# Patient Record
Sex: FEMALE | Race: WHITE | Employment: FULL TIME | URBAN - METROPOLITAN AREA
[De-identification: names, ages, dates, MRNs, and addresses within clinical notes are randomized per-mention and may not be internally consistent; named-entity substitution may affect disease eponyms.]

---

## 2017-11-30 ENCOUNTER — HOSPITAL ENCOUNTER (OUTPATIENT)
Dept: PREADMISSION TESTING | Age: 62
Discharge: HOME | End: 2017-11-30
Payer: COMMERCIAL

## 2017-11-30 VITALS — HEIGHT: 66 IN | WEIGHT: 157 LBS | BODY MASS INDEX: 25.23 KG/M2

## 2017-11-30 LAB
ANION GAP SERPL CALC-SCNC: 11 MMOL/L (ref 3–18)
APPEARANCE UR: CLEAR
ATRIAL RATE: 73 BPM
BACTERIA URNS QL MICRO: ABNORMAL /HPF
BILIRUB UR QL: NEGATIVE
BUN SERPL-MCNC: 20 MG/DL (ref 7–18)
BUN/CREAT SERPL: 24 (ref 12–20)
CALCIUM SERPL-MCNC: 9 MG/DL (ref 8.5–10.1)
CALCULATED P AXIS, ECG09: 58 DEGREES
CALCULATED R AXIS, ECG10: 27 DEGREES
CALCULATED T AXIS, ECG11: 64 DEGREES
CHLORIDE SERPL-SCNC: 106 MMOL/L (ref 100–108)
CO2 SERPL-SCNC: 28 MMOL/L (ref 21–32)
COLOR UR: YELLOW
CREAT SERPL-MCNC: 0.83 MG/DL (ref 0.6–1.3)
DIAGNOSIS, 93000: NORMAL
EPITH CASTS URNS QL MICRO: NEGATIVE /LPF (ref 0–5)
ERYTHROCYTE [DISTWIDTH] IN BLOOD BY AUTOMATED COUNT: 13.6 % (ref 11.6–14.5)
GLUCOSE SERPL-MCNC: 146 MG/DL (ref 74–99)
GLUCOSE UR STRIP.AUTO-MCNC: NEGATIVE MG/DL
HCT VFR BLD AUTO: 37.9 % (ref 35–45)
HGB BLD-MCNC: 12.7 G/DL (ref 12–16)
HGB UR QL STRIP: NEGATIVE
KETONES UR QL STRIP.AUTO: NEGATIVE MG/DL
LEUKOCYTE ESTERASE UR QL STRIP.AUTO: ABNORMAL
MCH RBC QN AUTO: 30.8 PG (ref 24–34)
MCHC RBC AUTO-ENTMCNC: 33.5 G/DL (ref 31–37)
MCV RBC AUTO: 92 FL (ref 74–97)
NITRITE UR QL STRIP.AUTO: NEGATIVE
P-R INTERVAL, ECG05: 154 MS
PH UR STRIP: 5 [PH] (ref 5–8)
PLATELET # BLD AUTO: 312 K/UL (ref 135–420)
PMV BLD AUTO: 11.1 FL (ref 9.2–11.8)
POTASSIUM SERPL-SCNC: 3.9 MMOL/L (ref 3.5–5.5)
PROT UR STRIP-MCNC: NEGATIVE MG/DL
Q-T INTERVAL, ECG07: 430 MS
QRS DURATION, ECG06: 92 MS
QTC CALCULATION (BEZET), ECG08: 473 MS
RBC # BLD AUTO: 4.12 M/UL (ref 4.2–5.3)
RBC #/AREA URNS HPF: NEGATIVE /HPF (ref 0–5)
SODIUM SERPL-SCNC: 145 MMOL/L (ref 136–145)
SP GR UR REFRACTOMETRY: 1.01 (ref 1–1.03)
UROBILINOGEN UR QL STRIP.AUTO: 0.2 EU/DL (ref 0.2–1)
VENTRICULAR RATE, ECG03: 73 BPM
WBC # BLD AUTO: 8.4 K/UL (ref 4.6–13.2)
WBC URNS QL MICRO: ABNORMAL /HPF (ref 0–5)

## 2017-11-30 PROCEDURE — 80048 BASIC METABOLIC PNL TOTAL CA: CPT | Performed by: ORTHOPAEDIC SURGERY

## 2017-11-30 PROCEDURE — 87641 MR-STAPH DNA AMP PROBE: CPT | Performed by: ORTHOPAEDIC SURGERY

## 2017-11-30 PROCEDURE — 87077 CULTURE AEROBIC IDENTIFY: CPT | Performed by: ORTHOPAEDIC SURGERY

## 2017-11-30 PROCEDURE — 93005 ELECTROCARDIOGRAM TRACING: CPT

## 2017-11-30 PROCEDURE — 81001 URINALYSIS AUTO W/SCOPE: CPT | Performed by: ORTHOPAEDIC SURGERY

## 2017-11-30 PROCEDURE — 85027 COMPLETE CBC AUTOMATED: CPT | Performed by: ORTHOPAEDIC SURGERY

## 2017-11-30 PROCEDURE — 87086 URINE CULTURE/COLONY COUNT: CPT | Performed by: ORTHOPAEDIC SURGERY

## 2017-11-30 PROCEDURE — 87186 SC STD MICRODIL/AGAR DIL: CPT | Performed by: ORTHOPAEDIC SURGERY

## 2017-11-30 RX ORDER — LANOLIN ALCOHOL/MO/W.PET/CERES
CREAM (GRAM) TOPICAL
COMMUNITY

## 2017-11-30 RX ORDER — MULTIVIT WITH MINERALS/HERBS
1 TABLET ORAL DAILY
COMMUNITY
End: 2017-12-20

## 2017-11-30 RX ORDER — CEFAZOLIN SODIUM 2 G/50ML
2 SOLUTION INTRAVENOUS ONCE
Status: CANCELLED | OUTPATIENT
Start: 2017-11-30 | End: 2017-11-30

## 2017-11-30 RX ORDER — LANOLIN ALCOHOL/MO/W.PET/CERES
1 CREAM (GRAM) TOPICAL DAILY
COMMUNITY
End: 2017-12-20

## 2017-11-30 RX ORDER — CITALOPRAM 10 MG/1
10 TABLET ORAL DAILY
COMMUNITY

## 2017-12-01 LAB
BACTERIA SPEC CULT: NORMAL
SERVICE CMNT-IMP: NORMAL

## 2017-12-02 LAB
BACTERIA SPEC CULT: ABNORMAL
SERVICE CMNT-IMP: ABNORMAL

## 2017-12-08 PROBLEM — N39.0 UTI (URINARY TRACT INFECTION): Chronic | Status: ACTIVE | Noted: 2017-12-08

## 2017-12-13 PROBLEM — M16.11 OSTEOARTHRITIS OF RIGHT HIP: Chronic | Status: ACTIVE | Noted: 2017-12-13

## 2017-12-13 PROBLEM — Z86.79 HISTORY OF RUPTURED ARTERIAL ANEURYSM: Chronic | Status: ACTIVE | Noted: 2017-12-13

## 2017-12-13 NOTE — H&P
History and Physical        Patient: Marleni Wolf               Sex: female          DOA: (Not on file)         YOB: 1955      Age:  57 y.o.        LOS:  LOS: 0 days        HPI:     Asha Bertrand is a 57year old right handed white female referred here for evaluation and treatment of right severe coxarthrosis. She is here today and has been known to have arthritis of the hip for the last year and this has just gotten steadily worse with time. Her pain is all in the right groin. She has not noted a leg length discrepancy. She had a recent brain aneurysm treated with a craniotomy and a secondary femoral artery procedure and is now here for consideration of trying to get her right hip done before the end of the year because of increasing pain in the hip and loss of stability. An AP view of the pelvis was obtained and interpreted in the office and show severe coxarthrosis with relatively equal leg lengths and large spur formation laterally. Otherwise, x-rays reveal no periosteal reaction, no medullary lesions, no osteopenia, well-aligned joint spaces, no chondrolysis, and no fractures. Past Medical History:   Diagnosis Date    OA (osteoarthritis) of hip 2017    right    Psychiatric disorder     depression       Past Surgical History:   Procedure Laterality Date    HX  SECTION  26    HX OTHER SURGICAL  2017    ruptured brain aneurysm repair       No family history on file.     Social History     Social History    Marital status:      Spouse name: N/A    Number of children: N/A    Years of education: N/A     Social History Main Topics    Smoking status: Never Smoker    Smokeless tobacco: Never Used    Alcohol use 2.4 oz/week     4 Glasses of wine per week    Drug use: No    Sexual activity: Not on file     Other Topics Concern    Not on file     Social History Narrative    No narrative on file       Prior to Admission medications    Medication Sig Start Date End Date Taking? Authorizing Provider   citalopram (CELEXA) 10 mg tablet Take 10 mg by mouth daily. Indications: VASOMOTOR SYMPTOMS ASSOCIATED WITH MENOPAUSE, depression    Historical Provider   ferrous sulfate (IRON) 325 mg (65 mg iron) tablet Take  by mouth Daily (before breakfast). Historical Provider   b complex vitamins (B COMPLEX 1) tablet Take 1 Tab by mouth daily. Historical Provider   glucosamine-chondroitin (ARTHX) 500-400 mg cap Take 1 Cap by mouth daily. Historical Provider       No Known Allergies    Review of Systems    Pertinent positives include joint pain and joint stiffness. Pertinent negatives include blurred vision, chest pain, chills, cold, discharge of the eyes, dizziness, double vision, fever, headache, hearing loss, heart murmur, itching of the eyes, palpitations, redness of the eyes, rheumatic fever, ringing in ears, sore throat/hoarseness, weight change, abdominal pain, anxiety, bipolar disorder, bladder/kidney infection, bloody stool, blood in urine, burning sensation, changes in mood, chronic cough, depression, diarrhea, difficulty breathing, difficulty swallowing, fainting, frequent urinating, fracture/dislocation, gas/bloating, gout, hemorrhoids, incontinence, loss of balance, memory loss, muscle weakness, nausea/vomiting, numbness/tingling, pain on breathing, painful urination, psoriasis, rash/itching, Raynaud's phenomenon, rheumatoid disease, seizure disorder, shortness of breath, sprain/strain, swelling of feet, tendonitis, varicose veins and wheezing. Physical Exam:      There were no vitals taken for this visit. Physical Exam:  Physical exam shows a healthy appearing 57year old white female. The right hip has only about a 25 degree arc of motion with pain referred back to the right groin. Her leg lengths appear equal today. Examination of the  hip shows the patient is nontender to palpation. The skin is normal with no contusions or wounds.   There is no pain at either hip or the greater trochanters. As the iliotibial band is stretched, there is no pain. The patient has normal light touch sensation in all dermatomal patterns. There is normal motor strength to heel and toe walking. There is negative straight leg raising bilaterally to 90 degrees in the seated position. The patient has good capillary refill. Assessment/Plan     Principal Problem:    Osteoarthritis of right hip (12/13/2017)    Active Problems:    UTI (urinary tract infection) (12/8/2017)      History of ruptured arterial aneurysm (12/13/2017)      Dr. Dinorah Bowman discussed a right direct anterior ROD with her. He talked to the patient about the risks, the alternatives, and the benefits including infection, pain, bleeding, and DVT. She is going to check with her neurosurgeon to make sure aspirin postoperatively for three weeks at 81mg twice a day is acceptable. We will use perioperative antibiotics because she is going to be an outpatient total hip and we will arrange my outpatient protocol. We will keep her leg length equal or lengthen it a millimeter or two and Dr. Dinorah Bowman issued Roxicodone and Keflex and sent this to her pharmacy today. She will pick it up ahead of time with the aspirin. Dr. Dinorah Bowman will plan to see her two weeks postoperatively.

## 2017-12-14 NOTE — PERIOP NOTES
Faxed and left v/m on 12/7 regarding anesthesia request for clearance due to ekg results, ekg faxed as well. Left v/m on 12/14 for update to the above.

## 2017-12-19 RX ORDER — SODIUM CHLORIDE 0.9 % (FLUSH) 0.9 %
5-10 SYRINGE (ML) INJECTION AS NEEDED
Status: CANCELLED | OUTPATIENT
Start: 2017-12-19

## 2017-12-19 RX ORDER — SODIUM CHLORIDE 0.9 % (FLUSH) 0.9 %
5-10 SYRINGE (ML) INJECTION EVERY 8 HOURS
Status: CANCELLED | OUTPATIENT
Start: 2017-12-19

## 2017-12-19 RX ORDER — DIPHENHYDRAMINE HCL 25 MG
25 CAPSULE ORAL
Status: CANCELLED | OUTPATIENT
Start: 2017-12-19

## 2017-12-20 ENCOUNTER — ANESTHESIA EVENT (OUTPATIENT)
Dept: SURGERY | Age: 62
End: 2017-12-20
Payer: COMMERCIAL

## 2017-12-20 ENCOUNTER — APPOINTMENT (OUTPATIENT)
Dept: GENERAL RADIOLOGY | Age: 62
End: 2017-12-20
Attending: PHYSICIAN ASSISTANT
Payer: COMMERCIAL

## 2017-12-20 ENCOUNTER — HOSPITAL ENCOUNTER (OUTPATIENT)
Age: 62
Setting detail: OUTPATIENT SURGERY
Discharge: HOME HEALTH | End: 2017-12-20
Attending: ORTHOPAEDIC SURGERY | Admitting: ORTHOPAEDIC SURGERY
Payer: COMMERCIAL

## 2017-12-20 ENCOUNTER — ANESTHESIA (OUTPATIENT)
Dept: SURGERY | Age: 62
End: 2017-12-20
Payer: COMMERCIAL

## 2017-12-20 VITALS
DIASTOLIC BLOOD PRESSURE: 87 MMHG | HEIGHT: 66 IN | WEIGHT: 153.31 LBS | OXYGEN SATURATION: 99 % | TEMPERATURE: 97.4 F | BODY MASS INDEX: 24.64 KG/M2 | SYSTOLIC BLOOD PRESSURE: 132 MMHG | RESPIRATION RATE: 16 BRPM | HEART RATE: 88 BPM

## 2017-12-20 LAB
ABO + RH BLD: NORMAL
BLOOD GROUP ANTIBODIES SERPL: NORMAL
SPECIMEN EXP DATE BLD: NORMAL

## 2017-12-20 PROCEDURE — 77030018836 HC SOL IRR NACL ICUM -A: Performed by: ORTHOPAEDIC SURGERY

## 2017-12-20 PROCEDURE — 77030034479 HC ADH SKN CLSR PRINEO J&J -B: Performed by: ORTHOPAEDIC SURGERY

## 2017-12-20 PROCEDURE — 97161 PT EVAL LOW COMPLEX 20 MIN: CPT

## 2017-12-20 PROCEDURE — 36415 COLL VENOUS BLD VENIPUNCTURE: CPT | Performed by: ORTHOPAEDIC SURGERY

## 2017-12-20 PROCEDURE — 74011000258 HC RX REV CODE- 258: Performed by: PHYSICIAN ASSISTANT

## 2017-12-20 PROCEDURE — 74011250636 HC RX REV CODE- 250/636: Performed by: PHYSICIAN ASSISTANT

## 2017-12-20 PROCEDURE — 77030018846 HC SOL IRR STRL H20 ICUM -A: Performed by: ORTHOPAEDIC SURGERY

## 2017-12-20 PROCEDURE — 77030031139 HC SUT VCRL2 J&J -A: Performed by: ORTHOPAEDIC SURGERY

## 2017-12-20 PROCEDURE — 74011000258 HC RX REV CODE- 258: Performed by: ORTHOPAEDIC SURGERY

## 2017-12-20 PROCEDURE — 86900 BLOOD TYPING SEROLOGIC ABO: CPT | Performed by: ORTHOPAEDIC SURGERY

## 2017-12-20 PROCEDURE — 74011000250 HC RX REV CODE- 250

## 2017-12-20 PROCEDURE — 74011250636 HC RX REV CODE- 250/636

## 2017-12-20 PROCEDURE — 74011250636 HC RX REV CODE- 250/636: Performed by: ANESTHESIOLOGY

## 2017-12-20 PROCEDURE — 76210000017 HC OR PH I REC 1.5 TO 2 HR: Performed by: ORTHOPAEDIC SURGERY

## 2017-12-20 PROCEDURE — 76210000023 HC REC RM PH II 2 TO 2.5 HR: Performed by: ORTHOPAEDIC SURGERY

## 2017-12-20 PROCEDURE — 74011250637 HC RX REV CODE- 250/637: Performed by: ANESTHESIOLOGY

## 2017-12-20 PROCEDURE — 97116 GAIT TRAINING THERAPY: CPT

## 2017-12-20 PROCEDURE — 77030011640 HC PAD GRND REM COVD -A: Performed by: ORTHOPAEDIC SURGERY

## 2017-12-20 PROCEDURE — 77030038010: Performed by: ORTHOPAEDIC SURGERY

## 2017-12-20 PROCEDURE — 77030037875 HC DRSG MEPILEX <16IN BORD MOLN -A: Performed by: ORTHOPAEDIC SURGERY

## 2017-12-20 PROCEDURE — 77030020782 HC GWN BAIR PAWS FLX 3M -B: Performed by: ORTHOPAEDIC SURGERY

## 2017-12-20 PROCEDURE — 77030018673: Performed by: ORTHOPAEDIC SURGERY

## 2017-12-20 PROCEDURE — 73502 X-RAY EXAM HIP UNI 2-3 VIEWS: CPT

## 2017-12-20 PROCEDURE — 77030012508 HC MSK AIRWY LMA AMBU -A: Performed by: ANESTHESIOLOGY

## 2017-12-20 PROCEDURE — 76060000033 HC ANESTHESIA 1 TO 1.5 HR: Performed by: ORTHOPAEDIC SURGERY

## 2017-12-20 PROCEDURE — 77030013708 HC HNDPC SUC IRR PULS STRY –B: Performed by: ORTHOPAEDIC SURGERY

## 2017-12-20 PROCEDURE — 74011000250 HC RX REV CODE- 250: Performed by: PHYSICIAN ASSISTANT

## 2017-12-20 PROCEDURE — 74011000250 HC RX REV CODE- 250: Performed by: ORTHOPAEDIC SURGERY

## 2017-12-20 PROCEDURE — 77030010507 HC ADH SKN DERMBND J&J -B: Performed by: ORTHOPAEDIC SURGERY

## 2017-12-20 PROCEDURE — 76010000149 HC OR TIME 1 TO 1.5 HR: Performed by: ORTHOPAEDIC SURGERY

## 2017-12-20 PROCEDURE — C1776 JOINT DEVICE (IMPLANTABLE): HCPCS | Performed by: ORTHOPAEDIC SURGERY

## 2017-12-20 PROCEDURE — 77030032490 HC SLV COMPR SCD KNE COVD -B: Performed by: ORTHOPAEDIC SURGERY

## 2017-12-20 PROCEDURE — 77030026044 HC TIP IRR PULS STRY -A: Performed by: ORTHOPAEDIC SURGERY

## 2017-12-20 PROCEDURE — 74011250636 HC RX REV CODE- 250/636: Performed by: ORTHOPAEDIC SURGERY

## 2017-12-20 DEVICE — CUP ACET DIA52MM HIP GRIPTION PRI CEMENTLESS FIX SECT SER: Type: IMPLANTABLE DEVICE | Site: HIP | Status: FUNCTIONAL

## 2017-12-20 DEVICE — ELIMINATOR H APEX FOR 48-60MM PINN HIP SHELL: Type: IMPLANTABLE DEVICE | Site: HIP | Status: FUNCTIONAL

## 2017-12-20 DEVICE — STEM FEM SZ 4 L103MM 130DEG CALCAR HIP GRIPTION 12/14 TAPR: Type: IMPLANTABLE DEVICE | Site: HIP | Status: FUNCTIONAL

## 2017-12-20 DEVICE — LINER ACET OD52MM ID36MM HIP ALTRX PINN: Type: IMPLANTABLE DEVICE | Site: HIP | Status: FUNCTIONAL

## 2017-12-20 DEVICE — HEAD FEM DIA36MM +1.5MM OFFSET 12/14 TAPR HIP CERAMIC: Type: IMPLANTABLE DEVICE | Site: HIP | Status: FUNCTIONAL

## 2017-12-20 DEVICE — COMPONENT TOT HIP PRIMARY CERM ALTRX: Type: IMPLANTABLE DEVICE | Site: HIP | Status: FUNCTIONAL

## 2017-12-20 RX ORDER — SODIUM CHLORIDE, SODIUM LACTATE, POTASSIUM CHLORIDE, CALCIUM CHLORIDE 600; 310; 30; 20 MG/100ML; MG/100ML; MG/100ML; MG/100ML
50 INJECTION, SOLUTION INTRAVENOUS CONTINUOUS
Status: DISCONTINUED | OUTPATIENT
Start: 2017-12-20 | End: 2017-12-20 | Stop reason: HOSPADM

## 2017-12-20 RX ORDER — MAGNESIUM SULFATE 100 %
4 CRYSTALS MISCELLANEOUS AS NEEDED
Status: DISCONTINUED | OUTPATIENT
Start: 2017-12-20 | End: 2017-12-20 | Stop reason: HOSPADM

## 2017-12-20 RX ORDER — DEXAMETHASONE SODIUM PHOSPHATE 4 MG/ML
INJECTION, SOLUTION INTRA-ARTICULAR; INTRALESIONAL; INTRAMUSCULAR; INTRAVENOUS; SOFT TISSUE AS NEEDED
Status: DISCONTINUED | OUTPATIENT
Start: 2017-12-20 | End: 2017-12-20 | Stop reason: HOSPADM

## 2017-12-20 RX ORDER — PROPOFOL 10 MG/ML
INJECTION, EMULSION INTRAVENOUS AS NEEDED
Status: DISCONTINUED | OUTPATIENT
Start: 2017-12-20 | End: 2017-12-20 | Stop reason: HOSPADM

## 2017-12-20 RX ORDER — HYDROMORPHONE HYDROCHLORIDE 2 MG/ML
0.5 INJECTION, SOLUTION INTRAMUSCULAR; INTRAVENOUS; SUBCUTANEOUS
Status: DISCONTINUED | OUTPATIENT
Start: 2017-12-20 | End: 2017-12-20 | Stop reason: HOSPADM

## 2017-12-20 RX ORDER — MAGNESIUM SULFATE 100 %
4 CRYSTALS MISCELLANEOUS AS NEEDED
Status: DISCONTINUED | OUTPATIENT
Start: 2017-12-20 | End: 2017-12-20 | Stop reason: SDUPTHER

## 2017-12-20 RX ORDER — CEFAZOLIN SODIUM 2 G/50ML
2 SOLUTION INTRAVENOUS ONCE
Status: COMPLETED | OUTPATIENT
Start: 2017-12-20 | End: 2017-12-20

## 2017-12-20 RX ORDER — FENTANYL CITRATE 50 UG/ML
25 INJECTION, SOLUTION INTRAMUSCULAR; INTRAVENOUS
Status: ACTIVE | OUTPATIENT
Start: 2017-12-20 | End: 2017-12-20

## 2017-12-20 RX ORDER — LIDOCAINE HYDROCHLORIDE 20 MG/ML
INJECTION, SOLUTION EPIDURAL; INFILTRATION; INTRACAUDAL; PERINEURAL AS NEEDED
Status: DISCONTINUED | OUTPATIENT
Start: 2017-12-20 | End: 2017-12-20 | Stop reason: HOSPADM

## 2017-12-20 RX ORDER — FENTANYL CITRATE 50 UG/ML
INJECTION, SOLUTION INTRAMUSCULAR; INTRAVENOUS AS NEEDED
Status: DISCONTINUED | OUTPATIENT
Start: 2017-12-20 | End: 2017-12-20 | Stop reason: HOSPADM

## 2017-12-20 RX ORDER — DEXTROSE 50 % IN WATER (D50W) INTRAVENOUS SYRINGE
25-50 AS NEEDED
Status: DISCONTINUED | OUTPATIENT
Start: 2017-12-20 | End: 2017-12-20 | Stop reason: SDUPTHER

## 2017-12-20 RX ORDER — CELECOXIB 100 MG/1
200 CAPSULE ORAL
Status: CANCELLED | OUTPATIENT
Start: 2017-12-20 | End: 2017-12-20

## 2017-12-20 RX ORDER — OXYCODONE HYDROCHLORIDE 5 MG/1
10 TABLET ORAL ONCE
Status: COMPLETED | OUTPATIENT
Start: 2017-12-20 | End: 2017-12-20

## 2017-12-20 RX ORDER — INSULIN LISPRO 100 [IU]/ML
INJECTION, SOLUTION INTRAVENOUS; SUBCUTANEOUS ONCE
Status: DISCONTINUED | OUTPATIENT
Start: 2017-12-20 | End: 2017-12-20 | Stop reason: SDUPTHER

## 2017-12-20 RX ORDER — ACETAMINOPHEN 10 MG/ML
1000 INJECTION, SOLUTION INTRAVENOUS ONCE
Status: COMPLETED | OUTPATIENT
Start: 2017-12-20 | End: 2017-12-20

## 2017-12-20 RX ORDER — HYDROMORPHONE HYDROCHLORIDE 1 MG/ML
INJECTION, SOLUTION INTRAMUSCULAR; INTRAVENOUS; SUBCUTANEOUS AS NEEDED
Status: DISCONTINUED | OUTPATIENT
Start: 2017-12-20 | End: 2017-12-20 | Stop reason: HOSPADM

## 2017-12-20 RX ORDER — SODIUM CHLORIDE 0.9 % (FLUSH) 0.9 %
5-10 SYRINGE (ML) INJECTION AS NEEDED
Status: DISCONTINUED | OUTPATIENT
Start: 2017-12-20 | End: 2017-12-20 | Stop reason: HOSPADM

## 2017-12-20 RX ORDER — ONDANSETRON 2 MG/ML
4 INJECTION INTRAMUSCULAR; INTRAVENOUS ONCE
Status: DISCONTINUED | OUTPATIENT
Start: 2017-12-20 | End: 2017-12-20 | Stop reason: HOSPADM

## 2017-12-20 RX ORDER — OXYCODONE HYDROCHLORIDE 5 MG/1
5-10 TABLET ORAL
Qty: 60 TAB | Refills: 0 | Status: SHIPPED | OUTPATIENT
Start: 2017-12-20

## 2017-12-20 RX ORDER — GUAIFENESIN 100 MG/5ML
81 LIQUID (ML) ORAL 2 TIMES DAILY
Qty: 42 TAB | Refills: 0 | Status: SHIPPED | OUTPATIENT
Start: 2017-12-20

## 2017-12-20 RX ORDER — DIPHENHYDRAMINE HYDROCHLORIDE 50 MG/ML
12.5 INJECTION, SOLUTION INTRAMUSCULAR; INTRAVENOUS
Status: DISCONTINUED | OUTPATIENT
Start: 2017-12-20 | End: 2017-12-20 | Stop reason: HOSPADM

## 2017-12-20 RX ORDER — PREGABALIN 100 MG/1
100 CAPSULE ORAL
Status: CANCELLED | OUTPATIENT
Start: 2017-12-20 | End: 2017-12-20

## 2017-12-20 RX ORDER — GLYCOPYRROLATE 0.2 MG/ML
INJECTION INTRAMUSCULAR; INTRAVENOUS AS NEEDED
Status: DISCONTINUED | OUTPATIENT
Start: 2017-12-20 | End: 2017-12-20 | Stop reason: HOSPADM

## 2017-12-20 RX ORDER — CEPHALEXIN 500 MG/1
500 CAPSULE ORAL 4 TIMES DAILY
Qty: 4 CAP | Refills: 0 | Status: SHIPPED | OUTPATIENT
Start: 2017-12-20 | End: 2017-12-21

## 2017-12-20 RX ORDER — ONDANSETRON 2 MG/ML
INJECTION INTRAMUSCULAR; INTRAVENOUS AS NEEDED
Status: DISCONTINUED | OUTPATIENT
Start: 2017-12-20 | End: 2017-12-20 | Stop reason: HOSPADM

## 2017-12-20 RX ORDER — SODIUM CHLORIDE, SODIUM LACTATE, POTASSIUM CHLORIDE, CALCIUM CHLORIDE 600; 310; 30; 20 MG/100ML; MG/100ML; MG/100ML; MG/100ML
125 INJECTION, SOLUTION INTRAVENOUS CONTINUOUS
Status: DISCONTINUED | OUTPATIENT
Start: 2017-12-20 | End: 2017-12-20 | Stop reason: HOSPADM

## 2017-12-20 RX ORDER — ALBUTEROL SULFATE 0.83 MG/ML
2.5 SOLUTION RESPIRATORY (INHALATION) AS NEEDED
Status: DISCONTINUED | OUTPATIENT
Start: 2017-12-20 | End: 2017-12-20 | Stop reason: HOSPADM

## 2017-12-20 RX ORDER — SODIUM CHLORIDE, SODIUM LACTATE, POTASSIUM CHLORIDE, CALCIUM CHLORIDE 600; 310; 30; 20 MG/100ML; MG/100ML; MG/100ML; MG/100ML
150 INJECTION, SOLUTION INTRAVENOUS CONTINUOUS
Status: DISCONTINUED | OUTPATIENT
Start: 2017-12-20 | End: 2017-12-20 | Stop reason: HOSPADM

## 2017-12-20 RX ORDER — MIDAZOLAM HYDROCHLORIDE 1 MG/ML
INJECTION, SOLUTION INTRAMUSCULAR; INTRAVENOUS AS NEEDED
Status: DISCONTINUED | OUTPATIENT
Start: 2017-12-20 | End: 2017-12-20 | Stop reason: HOSPADM

## 2017-12-20 RX ORDER — NALOXONE HYDROCHLORIDE 0.4 MG/ML
0.1 INJECTION, SOLUTION INTRAMUSCULAR; INTRAVENOUS; SUBCUTANEOUS
Status: DISCONTINUED | OUTPATIENT
Start: 2017-12-20 | End: 2017-12-20 | Stop reason: HOSPADM

## 2017-12-20 RX ORDER — DEXTROSE 50 % IN WATER (D50W) INTRAVENOUS SYRINGE
25-50 AS NEEDED
Status: DISCONTINUED | OUTPATIENT
Start: 2017-12-20 | End: 2017-12-20 | Stop reason: HOSPADM

## 2017-12-20 RX ORDER — HYDROCODONE BITARTRATE AND ACETAMINOPHEN 5; 325 MG/1; MG/1
1 TABLET ORAL AS NEEDED
Status: DISCONTINUED | OUTPATIENT
Start: 2017-12-20 | End: 2017-12-20 | Stop reason: HOSPADM

## 2017-12-20 RX ORDER — SODIUM CHLORIDE 0.9 % (FLUSH) 0.9 %
5-10 SYRINGE (ML) INJECTION AS NEEDED
Status: DISCONTINUED | OUTPATIENT
Start: 2017-12-20 | End: 2017-12-20 | Stop reason: SDUPTHER

## 2017-12-20 RX ORDER — OXYCODONE AND ACETAMINOPHEN 5; 325 MG/1; MG/1
1 TABLET ORAL AS NEEDED
Status: DISCONTINUED | OUTPATIENT
Start: 2017-12-20 | End: 2017-12-20 | Stop reason: HOSPADM

## 2017-12-20 RX ORDER — NALOXONE HYDROCHLORIDE 0.4 MG/ML
0.1 INJECTION, SOLUTION INTRAMUSCULAR; INTRAVENOUS; SUBCUTANEOUS AS NEEDED
Status: DISCONTINUED | OUTPATIENT
Start: 2017-12-20 | End: 2017-12-20 | Stop reason: HOSPADM

## 2017-12-20 RX ORDER — INSULIN LISPRO 100 [IU]/ML
INJECTION, SOLUTION INTRAVENOUS; SUBCUTANEOUS ONCE
Status: DISCONTINUED | OUTPATIENT
Start: 2017-12-20 | End: 2017-12-20 | Stop reason: HOSPADM

## 2017-12-20 RX ADMIN — SODIUM CHLORIDE, SODIUM LACTATE, POTASSIUM CHLORIDE, AND CALCIUM CHLORIDE 1000 ML: 600; 310; 30; 20 INJECTION, SOLUTION INTRAVENOUS at 07:37

## 2017-12-20 RX ADMIN — SODIUM CHLORIDE, SODIUM LACTATE, POTASSIUM CHLORIDE, AND CALCIUM CHLORIDE: 600; 310; 30; 20 INJECTION, SOLUTION INTRAVENOUS at 09:30

## 2017-12-20 RX ADMIN — HYDROMORPHONE HYDROCHLORIDE 0.5 MG: 1 INJECTION, SOLUTION INTRAMUSCULAR; INTRAVENOUS; SUBCUTANEOUS at 09:55

## 2017-12-20 RX ADMIN — SODIUM CHLORIDE, SODIUM LACTATE, POTASSIUM CHLORIDE, AND CALCIUM CHLORIDE 125 ML/HR: 600; 310; 30; 20 INJECTION, SOLUTION INTRAVENOUS at 07:16

## 2017-12-20 RX ADMIN — DEXAMETHASONE SODIUM PHOSPHATE 4 MG: 4 INJECTION, SOLUTION INTRA-ARTICULAR; INTRALESIONAL; INTRAMUSCULAR; INTRAVENOUS; SOFT TISSUE at 09:02

## 2017-12-20 RX ADMIN — TRANEXAMIC ACID 1.5 G: 100 INJECTION, SOLUTION INTRAVENOUS at 09:07

## 2017-12-20 RX ADMIN — PROPOFOL 200 MG: 10 INJECTION, EMULSION INTRAVENOUS at 08:48

## 2017-12-20 RX ADMIN — OXYCODONE HYDROCHLORIDE 10 MG: 5 TABLET ORAL at 07:30

## 2017-12-20 RX ADMIN — CEFAZOLIN SODIUM 2 G: 2 SOLUTION INTRAVENOUS at 08:55

## 2017-12-20 RX ADMIN — SODIUM CHLORIDE, SODIUM LACTATE, POTASSIUM CHLORIDE, AND CALCIUM CHLORIDE 150 ML/HR: 600; 310; 30; 20 INJECTION, SOLUTION INTRAVENOUS at 10:55

## 2017-12-20 RX ADMIN — FENTANYL CITRATE 100 MCG: 50 INJECTION, SOLUTION INTRAMUSCULAR; INTRAVENOUS at 08:44

## 2017-12-20 RX ADMIN — ONDANSETRON 4 MG: 2 INJECTION INTRAMUSCULAR; INTRAVENOUS at 09:29

## 2017-12-20 RX ADMIN — MIDAZOLAM HYDROCHLORIDE 2 MG: 1 INJECTION, SOLUTION INTRAMUSCULAR; INTRAVENOUS at 08:44

## 2017-12-20 RX ADMIN — GLYCOPYRROLATE 0.2 MG: 0.2 INJECTION INTRAMUSCULAR; INTRAVENOUS at 09:01

## 2017-12-20 RX ADMIN — LIDOCAINE HYDROCHLORIDE 40 MG: 20 INJECTION, SOLUTION EPIDURAL; INFILTRATION; INTRACAUDAL; PERINEURAL at 08:48

## 2017-12-20 RX ADMIN — HYDROMORPHONE HYDROCHLORIDE 0.5 MG: 1 INJECTION, SOLUTION INTRAMUSCULAR; INTRAVENOUS; SUBCUTANEOUS at 09:41

## 2017-12-20 RX ADMIN — ACETAMINOPHEN 1000 MG: 10 INJECTION, SOLUTION INTRAVENOUS at 09:12

## 2017-12-20 NOTE — PERIOP NOTES
TRANSFER - OUT REPORT:    Verbal report given to Darcie Favre RN (name) on Mely Fnug  being transferred to Phase II (unit) for routine progression of care       Report consisted of patients Situation, Background, Assessment and   Recommendations(SBAR). Information from the following report(s) SBAR, Kardex, Procedure Summary and Intake/Output was reviewed with the receiving nurse. Lines:   Peripheral IV 12/20/17 Left Forearm (Active)   Site Assessment Clean, dry, & intact 12/20/2017 10:44 AM   Phlebitis Assessment 0 12/20/2017 10:44 AM   Infiltration Assessment 0 12/20/2017 10:44 AM   Dressing Status Clean, dry, & intact 12/20/2017 10:44 AM   Dressing Type Transparent;Tape 12/20/2017 10:44 AM   Hub Color/Line Status Infusing 12/20/2017 10:44 AM        Opportunity for questions and clarification was provided.       Patient transported with:   O2 @ 2 liters  Registered Nurse

## 2017-12-20 NOTE — PROGRESS NOTES
Problem: Mobility Impaired (Adult and Pediatric)  Goal: *Acute Goals and Plan of Care (Insert Text)  Physical Therapy Goals LT/ST  Initiated 12/20/2017 and to be accomplished within 3-5 day(s)  1. Patient will move from supine <> sit with S in prep for out of bed activity and change of position. 2.  Patient will perform sit<> stand with S with LRAD in prep for transfers/ambulation. 3.  Patient will transfer from bed <> chair with S with LRAD for time up in chair for completion of ADL activity. 4.  Patient will ambulate 150 feet with LRAD/S for improved functional mobility/safe discharge. 5.  Patient will ascend/descend 3-5 stairs with L handrails with minimal assistance/contact guard assist for home re-entry as needed. Outcome: Progressing Towards Goal  physical Therapy EVALUATION    Patient: Lucie Kelly (66 y.o. female)  Date: 12/20/2017  Primary Diagnosis: RIGHT HIP OSTEOARTHRITIS  Procedure(s) (LRB):  RIGHT TOTAL HIP ARTHROPLASY ANTERIOR APPROACH W/NAVIGATION (Right) Day of Surgery   Precautions:Fall, WBAT    ASSESSMENT :  Based on the objective data described below, the patient presents with decrease independence w/ bed mobility, transfers, gait, and step negotiation. Pt seen sitting in recliner chair prior to session. Pt reports that she is fatigued and nausea, however no lightheadedness or dizziness at this time. Pt reports 0/10 pain in R hip prior session. Pt requested to use the restroom prior to gait assessment. Pt able to ambulate to the bathroom however required VCing for proper hand placement w/ RW to ensure stability. Once pt sat on the toilet to void, pt also had increase nausea and vomited in the emesis bag. Once pt completed to toileting task and vomit cleaned from the pt, pt was able to ambulate in the hallway.  Pt able to ambulate 60 ft w/ RW/GB but requires constant VCing for proper gait sequencing, step to antalgic gait, decrease stride length, decrease step clearance, and decrease shakira. Pt required constant VCing to maintain alertness while ambulating and to maintain erect posture. Pt able to perform step negotiation using box step and RW w/o any difficulty at this time. Pt transferred back to recliner where PT educated pt on therex activity. Pt left sitting in recliner w/ spouse present in the room, nurse notified after session. Patient will benefit from skilled intervention to address the above impairments. Patients rehabilitation potential is considered to be Fair  Factors which may influence rehabilitation potential include:   []         None noted  []         Mental ability/status  [x]         Medical condition  [x]         Home/family situation and support systems  [x]         Safety awareness  [x]         Pain tolerance/management  []         Other:      PLAN :  Recommendations and Planned Interventions:  [x]           Bed Mobility Training             []    Neuromuscular Re-Education  [x]           Transfer Training                   []    Orthotic/Prosthetic Training  [x]           Gait Training                          []    Modalities  [x]           Therapeutic Exercises          []    Edema Management/Control  [x]           Therapeutic Activities            [x]    Patient and Family Training/Education  []           Other (comment):    Frequency/Duration: Patient will be followed by physical therapy twice daily to address goals. Discharge Recommendations: Home Health  Further Equipment Recommendations for Discharge: rolling walker     SUBJECTIVE:   Patient stated I feel really tired now.     OBJECTIVE DATA SUMMARY:     Past Medical History:   Diagnosis Date    OA (osteoarthritis) of hip 2017    right    Psychiatric disorder     depression     Past Surgical History:   Procedure Laterality Date    HX  SECTION  26    HX OTHER SURGICAL  2017    ruptured brain aneurysm repair     Barriers to Learning/Limitations: yes;  physical  Compensate with: Verbal Cues and Tactile Cues  Prior Level of Function/Home Situation:   Home Situation  Home Environment: Private residence  # Steps to Enter: 4  Rails to Enter: Yes  Hand Rails : Left  One/Two Story Residence: Two story, live on 1st floor  Lift Chair Available: No  Living Alone: No  Support Systems: Spouse/Significant Other/Partner  Patient Expects to be Discharged to[de-identified] Private residence  Current DME Used/Available at Home: None  Critical Behavior:  Neurologic State:  (unsteady gait)  Orientation Level: Appropriate for age  Cognition: Appropriate decision making; Appropriate for age attention/concentration; Appropriate safety awareness  Safety/Judgement: Awareness of environment; Fall prevention  Skin Condition/Temp: Dry;Warm  Skin Integrity: Incision (comment) (right hip, left lower abdomen)  Skin Integumentary  Skin Color: Appropriate for ethnicity  Skin Condition/Temp: Dry;Warm  Skin Integrity: Incision (comment) (right hip, left lower abdomen)  Turgor: Non-tenting  Hair Growth: Present  Varicosities: Absent  Strength:    Strength: Within functional limits  Tone & Sensation:   Tone: Normal  Sensation: Intact  Range Of Motion:  AROM: Generally decreased, functional  PROM: Generally decreased, functional  Functional Mobility:  Bed Mobility:  Transfers:  Sit to Stand: Contact guard assistance  Stand to Sit: Contact guard assistance  Balance:   Sitting: Intact  Standing: Intact; With support  Ambulation/Gait Training:  Distance (ft): 60 Feet (ft)  Assistive Device: Gait belt;Walker, rolling  Ambulation - Level of Assistance: Contact guard assistance  Gait Description (WDL): Exceptions to WDL  Gait Abnormalities: Antalgic;Decreased step clearance; Step to gait  Base of Support: Shift to left  Stance: Right decreased  Speed/Kathleen: Slow  Step Length: Left shortened;Right shortened  Swing Pattern: Left asymmetrical;Right asymmetrical  Stairs:  Number of Stairs Trained: 2  Stairs - Level of Assistance: Contact guard assistance  [employer] Use: None  Therapeutic Exercises: Therex packet handed to pt per protocol  Pain:  Pain Scale 1: Numeric (0 - 10)  Pain Intensity 1: 0  Activity Tolerance:   Fair  Please refer to the flowsheet for vital signs taken during this treatment. After treatment:   [x]         Patient left in no apparent distress sitting up in chair  []         Patient left in no apparent distress in bed  [x]         Call bell left within reach  [x]         Nursing notified  [x]         Caregiver present  []         Bed alarm activated    COMMUNICATION/EDUCATION:   [x]         Fall prevention education was provided and the patient/caregiver indicated understanding. [x]         Patient/family have participated as able in goal setting and plan of care. [x]         Patient/family agree to work toward stated goals and plan of care. []         Patient understands intent and goals of therapy, but is neutral about his/her participation. []         Patient is unable to participate in goal setting and plan of care.     Thank you for this referral.  Mercy Escobar, PT   Time Calculation: 25 mins

## 2017-12-20 NOTE — OP NOTES
RIGHT COMPUTER NAVIGATED DIRECT ANTERIOR HIP REPLACEMENT    Patient: Charisse Cali MRN: [id number]  CSN: [id number]   YOB: 1955  Age: 57 y.o. Sex: female      Date of Surgery:12/20/2017    PREOPERATIVE DIAGNOSES   RIGHT HIP OSTEOARTHRITIS      POSTOPERATIVE DIAGNOSES   RIGHT HIP OSTEOARTHRITIS    PROCEDURE: Right press fit computer navigated direct anterior total hip arthroplasty using the [employer]. IMPLANTS:   Implant Name Type Inv. Item Serial No.  Lot No. LRB No. Used Action   CUP ACET SECTOR GRIPTION 52MM -- TI - [id number]  CUP ACET SECTOR GRIPTION 52MM -- TI  [facility] [employer] [id number] Right 1 Implanted   LINER ACET PINN NEUT [id number] -- ALTRX - [id number]  LINER ACET PINN NEUT [id number] -- ALTRX  [facility] [employer]TH[employer] [id number] Right 1 Implanted   PLUG ACET APCL H ELIM POS STP --  - [id number]  PLUG ACET APCL H ELIM POS STP --   [facility] [employer] [id number] Right 1 Implanted   HEAD FEM 36MM +1.5MM NK -- BIOLOX DELTA - [id number]  HEAD FEM 36MM +1.5MM NK -- BIOLOX DELTA  [facility] [employer]OP[employer] [id number] Right 1 Implanted   STEM FEM BPS SZ 4 STD OFFSET -- TRI-LOCK - [id number]   STEM FEM BPS SZ 4 STD OFFSET -- TRI-LOCK   [facility] [employer]TH[employer] [id number] Right 1 Implanted       SURGEON: Whit Folres MD    ASSISTANTS: Christian Moss PA-C    ANESTHESIA: General    SPECIMENS TO PATHOLOGY:  * No specimens in log *    ESTIMATED BLOOD LOSS: 100cc    FINDINGS: Same    COMPLICATIONS: None    INDICATIONS:  A 57 y.o.  female with right severe coxarthrosis documented by clinical exam and x-ray. TThe patient has bone-on-bone arthritis by x-rays and has failed all conservative interventions including medications, weight loss, physical therapy, relative rest, ambulatory aids and injections. The patient now presents for a right total hip arthroplasty due to constant pain with activities of daily living and diminished safety due to patients pain.   The patients operative leg has a 0 mm leg length discrepency clinically. The patient does not feel that the operative leg is [place] than the nonoperative leg. OPERATION:  The patient was brought into the operating theater, and after adequate appropriate anesthesia the patient was placed on the [place] table. The right hip was prepped and draped for typical computer navigated anterior hip surgery. The 1.5gm of iv tranexamic acid was already administered by incision time. The opposite iliac crest had 2 small incisions made for the two 4.0mm Shantz half pins that were placed in the iliac crest using the [employer] Navigation guide. The pelvis tracker was then mounted to the guide. The pelvis was registered as well as the left and the right ASIS. Another small incision was made on the lateral ipsilateral lower leg just above the knee. A 2.0mm drill bit was used to make a hole in the lateral cortex and then leg length were measured using this hole and the pointer tracker. This leg length was verified. The analgesic cocktail used for the case was 50cc of .25% Marcaine with epinephrine mixed with 30 mg( 1cc ) of Toradol, 10mg of Morphine Sulfate ( 1cc ) and 30cc of NS ( total of 82 cc). This was injected in the skin as well as the various muscle muscle groups encountered during the procedure using all 100cc's. A 9 cm incision was then made, 3 cm lateral to the anterior superior iliac spine, and 1 cm distal. The incision was deepened down to the fascia of the tensor fascia lico muscle, where the fascia was incised the length of the wound. A Cobra was placed just lateral to the anterior inferior iliac spine and just lateral to the capsule of the hip. The tensor fascia muscle was then retracted with the Ebony, and the rectus femoris was retracted medially with another Ebony. The ascending branches of the lateral femoral circumflex vessels were then clamped and electrocauterized.  Using a Blank elevator, the soft tissue was elevated off the anterior part of the femoral capsule, and a Cobra retractor was placed medially. An anterolateral capsulotomy was then performed, from the acetabulum to the intertrochanteric line. The lateral capsule was excised, and the anterior capsule was elevated off the medial neck. The leg was then slightly externally rotated with traction and cut 1 fingerbreadth above the lesser trochanter. The corkscrew was inserted into the femoral head and it was removed easily rotating the head 180 degrees. A Cobra retractor was placed behind the acetabulum. A skinny Hohmann retractor was placed on the anterior part of the acetabulum rim, and then the labrum and any osteophytes around the acetabulum were resected. The acetabulum was registered using the pointer tracker. The pulvinar in the fovea was resected, and then the acetabulum was reamed in 45 degrees of abduction and the patient's natural anteversion. The reamer was medialized to the base of the fovea and then widened to 1mm less than the actual cup to be implanted. There was good peripheral fit with good bleeding bone. An  appropriately sized acetabular cup was selected to be implanted. The acetabulum was Simpulse lavage irrigated and then dried with a sponge stick. The cup was then seated fully with excellent purchase and good stability. The [employer] Navigation system helped select the appropriate abduction and abduction as well as using the patients anatomic landmarks. The final abduction was 39 degrees and the anteversion was 20 degrees. The anterior lip of the cup was just inside the natural acetabulum. The hole eliminator was then placed, and the appropriately sized acetabular liner was then Page-tapered into position. No screws were used in the acetabular cup(6.5 cancellous screws). Attention was now turned to the femur. The femoral hook was placed behind the femur.  Traction was taken off the leg, and the hip was maximally externally rotated, adducted and extended. The hip was then brought up into the wound as maximally as possible. A Sagastume retractor was placed on the medial neck, and a large Hohmann was placed around the greater trochanter. The capsule, the obturator internus and the piriformis were then released from the inside of the greater trochanter, from anterior to posterior. The patient had excellent mobility of the femur. The bone closest to the greater trochanter, at the femoral neck, was then removed with a rongeur. A box osteotome was then used to open the canal, then the lateralizer, the starter broach and finally by the zero broach. This was then broached up to the appropriately size progressively, following the anteversion of the posterior neck of the femur. Once the broach was seated completely the calcar was planed to make the femoral neck cut smooth and even. There was excellent stability on torquing the femoral broach in the femoral canal. The patient was trialed with a  neck and with different neck lengths. The femur was reduced in the acetabulum and the hip was checked for stability clinically and the [employer] Navigation system was used for leg lengths. The appropriately sized femoral head gave excellent anterior stability. The hip could be rotated externally 90 degrees at the knee and placing a bone hook behind the femoral neck it could not be dislocated anteriorly. The leg length was 0 mm compared to pre-incision measurement. These components were selected for implantation. All trial components were removed. The femur was Simpulse lavaged, and  the appropriately sized femoral stem was impacted down the femur, with excellent purchase and rotational stability. The appropriately sized femoral head was Page tapered on top of the femoral component, reduced into the socket, and the patient had the exact same stability characteristics and leg lengths as noted previously. The wound was irrigated out.  The fascia of the tensor muscle was closed with a running locking #1 Vicryl. The skin was closed with inverted 2-0 Vicryls and then dermabonded ( Dermabond Sy ) in 2 layers. The wound was dressed with a Mepilex dressing. The 3 small stab incisions used for the [employer] system were Dermabonded closed. The patient returned to the recovery room awake and in stable condition. All instrument, sponge, and needle counts were correct. During multiple steps in the procedure the simpulse lavage was used with a 500cc bag of normal saline with 30cc of 5% sterile opthalmologic povidone solution ( .30% ). Before component implantation the bone was irrigated  with normal saline on a bulb syringe. At the end of the case another 500cc normal saline bag (with 50,000 units of bacitracin and 500,000 units of polymixin )was attached to the simpulse lavage and the entire wound reirrgated before closure.       Rodolfo Gonzalez MD  12/20/2017

## 2017-12-20 NOTE — ANESTHESIA PREPROCEDURE EVALUATION
Anesthetic History   No history of anesthetic complications            Review of Systems / Medical History  Patient summary reviewed, nursing notes reviewed and pertinent labs reviewed    Pulmonary  Within defined limits                 Neuro/Psych   Within defined limits           Cardiovascular  Within defined limits                     GI/Hepatic/Renal  Within defined limits              Endo/Other  Within defined limits           Other Findings              Physical Exam    Airway  Mallampati: II  TM Distance: 4 - 6 cm  Neck ROM: normal range of motion   Mouth opening: Normal     Cardiovascular  Regular rate and rhythm,  S1 and S2 normal,  no murmur, click, rub, or gallop             Dental  No notable dental hx       Pulmonary  Breath sounds clear to auscultation               Abdominal  GI exam deferred       Other Findings            Anesthetic Plan    ASA: 2  Anesthesia type: general          Induction: Intravenous  Anesthetic plan and risks discussed with: Patient

## 2017-12-20 NOTE — PERIOP NOTES
Reviewed PTA medication list with patient/caregiver and patient/caregiver denies any additional medications. Patient admits to having a responsible adult care for them for at least 24 hours after surgery.     Took antibiotic after C/s was done. - Doctor Yessica Ortega called in the Prescription. Able to perform IS up to 2000 w/o difficulty. 0830 Pt up to bathroom to void with assistance.

## 2017-12-20 NOTE — IP AVS SNAPSHOT
[address] [facility] 
 
 
 [address] [facility] [address] [address] 
[phone] Patient: Amada Garcia MRN: [id number]:[id number] About your hospitalization You were admitted on:  December 20, 2017 You last received care in the:  [facility] PACU You were discharged on:  December 20, 2017 Why you were hospitalized Your primary diagnosis was:  Osteoarthritis Of Right Hip Your diagnoses also included:  Uti (Urinary Tract Infection), History Of Ruptured Arterial Aneurysm Things You Need To Do (next 8 weeks) Follow up with Melanie Timmons MD  
  
Phone:  [phone] Where:  [address] [facility], [address] [address] Follow up with Henrietta Palomo MD  
follow up appointment in 7-14 days Phone:  [phone] Where:  [address], [address] [facility] Discharge Orders None A check tamie indicates which time of day the medication should be taken. My Medications STOP taking these medications B COMPLEX 1 tablet Generic drug:  b complex vitamins  
   
  
 glucosamine-chondroitin 500-400 mg Cap Commonly known as:  [facility] TAKE these medications as instructed Instructions Each Dose to Equal  
 Morning Noon Evening Bedtime  
 aspirin 81 mg chewable tablet Your last dose was: Your next dose is: Take 1 Tab by mouth two (2) times a day. 81 mg  
    
   
   
   
  
 cephALEXin 500 mg capsule Commonly known as:  Viry Ly Your last dose was: Your next dose is: Take 1 Cap by mouth four (4) times daily for 1 day. 500 mg  
    
   
   
   
  
 citalopram 10 mg tablet Commonly known as:  Tempie Leonidas Your last dose was: Your next dose is: Take 10 mg by mouth daily. Indications: VASOMOTOR SYMPTOMS ASSOCIATED WITH MENOPAUSE, depression  10 mg  
    
   
   
   
  
 Iron 325 mg (65 mg iron) tablet Generic drug:  ferrous sulfate Your last dose was: Your next dose is: Take  by mouth Daily (before breakfast). oxyCODONE IR 5 mg immediate release tablet Commonly known as:  Corrinne Mitten Your last dose was: Your next dose is: Take 1-2 Tabs by mouth every four (4) hours as needed for Pain. Max Daily Amount: 60 mg.  
 5-10 mg Where to Get Your Medications Information on where to get these meds will be given to you by the nurse or doctor. ! Ask your nurse or doctor about these medications  
  aspirin 81 mg chewable tablet  
 cephALEXin 500 mg capsule  
 oxyCODONE IR 5 mg immediate release tablet Discharge Instructions Mukesh Cisneros III, MD Clarisa Sager, PA-C Lower Extremity Surgery Discharge Instructions Please take the time to review the following instructions before you leave the hospital and use them as guidelines during your recovery from surgery. If you have any questions you may contact my office at [phone]. Wound Care/Dressing Changes: You may change your dressing as needed. Beginning the 2 days after you are discharged from the hospital you can change your dressing daily. A big, bulky dressing isn't necessary as long as there isn't any drainage from the incisions. You will be shown how to change the dressings properly by the home health aids as well. There will be a piece of tape over your incision that resembles gauze. This tape should stay on and you should not attempt to remove it. Once you begin to get this wet in the shower this will begin to fall off on its own, although it will take days. Do not apply antibiotic ointment to your incisions. Showering/Bathing: You may shower 2 days after surgery.   Your dressing may be removed for showering. You may get your incisions wet in the shower. Don't vigorously scrub the area where your incisions are. Please pat dry the incision. Apply a clean, dry dressing after drying off the area of your incisions. Don't take a tub bath, get in a swimming pool or Jacuzzi until the incisions are completely healed, and you are seen in the office by Dr. Demetrio Ramon. Do not soak your incisions under water. Do not get the dressing wet. Once you remove it two days from surgery, you may get the incision wet if there is no drainage. Weight Bearing Status/Braces/Activity: If you have had a total knee replacement you may weight bear as tolerated with the knee immobilizer in place. Use crutches, cane, or walker as needed. You should sleep in your knee immobilizer. You need to begin working on range of motion early after your surgery. It is very important to work on extension (straighthening) the knee. You will be advanced with walking and range of motion by physical therapy at home. If you have had a total hip replacement you may weight bear as tolerated. Physical therapy with come by your house to help you perform exercises and work on strength and range of motion. Ice/Elevation: 
 
Continue ice and elevation consistently for 48 hours after surgery. If you have had a total knee replacement when elevating your knee elevate it on about 4 pillows to be sure it is above your heart. After 48 hours, you should ice your knee 3 times per day, for 20 minutes at a time for the next 5 days. After one week from surgery, you may use ice and elevation as needed for pain and swelling. Diet: 
 
You may advance to your regular diet as tolerated. Medication: 
 
1. You will be given a prescription for pain medications when you are discharged from the hospital.  Take the medication as needed according to the directions on the prescription bottle.   Possible side effects of the medication include dizziness, headache, nausea, vomiting, constipation and urinary retention. If you experience any of these side effects call the office so that we can assist you in relieving them. Discontinue the use of the pain medication if you develop itching, rash, shortness of breath or difficulties swallowing. If these symptoms become severe or aren't relieved by discontinuing the medication you should seek immediate medical attention. Refills of pain medication are authorized during office hours only (8AM - 5PM Mon thru Fri). 2. If you were prescribed Percocet/oxycodone or Dilaudid/hydromorphone you must have a written prescription. These medications legally cannot be called in to a pharmacy. 3. Do not take Tylenol in addition to your pain medication as most of the pain medication already contains Tylenol. Exceptions include Dilaudid/hydromorphone, Demerol/meperidine and roxicodone. Do not exceed 3000 mg of Tylenol per day. Ex:  (hydrocodone 5/325mg = 325 mg of Tylenol) 4. You should use Aspirin 81 mg twice daily for 21 days from the date of your surgery. This will help to prevent blood clots from forming in your legs. This needs to be started the day after your surgery and home healthcare will teach you how this is done. If you are taking another medication such as Xarelto as discussed with Dr. Dalila Lewis this should also be started the day after the surgery. 5. You may resume the medications you were taking prior to your surgery, unless otherwise specified in your discharge instructions. Pain medication may change the effects of any antidepressant medication. If you have any questions about possible interactions between your regular medications and the pain medication you should consult the physician who prescribes your regular medications.  
6. If you had a total joint as an outpatient procedure and did not spend the night in the hospital, Dr. Dalila Lewis has written for an oral antibiotic that you should take as instructed. This antibiotic is generally Keflex or Clindamycin. If you spent the night in the hospital the antibiotic was given to you through the IV and there is nothing else to take orally. Follow Up Appointment: If you are unsure of your follow-up appointment date and time, please call [phone]. Please let our  know you are scheduling a post-op appointment. Most appointments should be between 7-14 days after your surgery. Physical Therapy: 
 
   Physical therapy will be discussed with you at your first follow-up appointment with Dr. John Pardo. You don't need to begin physical therapy prior to that visit. You are to participate with [address] [place] [address] as arranged pre-operatively in the convience of your own home. Important signs and symptoms: 
 
If any of the following signs and symptoms occurs, you should contact Dr. John Pardo' office. Please be advised if a problem arises which you feel required immediate medical attention or your are unable to contact Dr. John aPrdo' office you should seek immediate medical attention. Signs and symptoms to watch for include: 1. A sudden increase in swelling and/or redness or warmth at the area your surgery was performed which isn't relieved by rest, ice and elevation. 2. Oral temperature greater than 101.5 degrees for 12 hours or more which isn't relieved by an increase in fluid intake and taking two Tylenol every 4-6 hours. Do not exceed 3000 mg of Tylenol per day. 3. Excessive drainage from your incisions or drainage that hasn't stopped by 72 hours. 4. Calf pian, tenderness, redness or swelling which isn't relieved with rest and elevation. 5. Fever, chills, shortness of breath, chest pain, nausea, vomiting or other signs and symptoms which are of concern to you. DISCHARGE SUMMARY from Nurse PATIENT INSTRUCTIONS: 
 
 
F-face looks uneven A-arms unable to move or move unevenly S-speech slurred or non-existent T-time-call 911 as soon as signs and symptoms begin-DO NOT go Back to bed or wait to see if you get better-TIME IS BRAIN. Warning Signs of HEART ATTACK Call 911 if you have these symptoms: 
? Chest discomfort. Most heart attacks involve discomfort in the center of the chest that lasts more than a few minutes, or that goes away and comes back. It can feel like uncomfortable pressure, squeezing, fullness, or pain. ? Discomfort in other areas of the upper body. Symptoms can include pain or discomfort in one or both arms, the back, neck, jaw, or stomach. ? Shortness of breath with or without chest discomfort. ? Other signs may include breaking out in a cold sweat, nausea, or lightheadedness. Don't wait more than five minutes to call [address]! Fast action can save your life. Calling 911 is almost always the fastest way to get lifesaving treatment. Emergency Medical Services staff can begin treatment when they arrive  up to an hour sooner than if someone gets to the hospital by car. Patient armband removed and shredded The discharge information has been reviewed with the patient and caregiver. The patient and caregiver verbalized understanding. Discharge medications reviewed with the patient and caregiver and appropriate educational materials and side effects teaching were provided. ___________________________________________________________________________________________________________________________________ Introducing [facility]! Stefania Srivastava introduces [employer] patient portal. Now you can access parts of your medical record, email your doctor's office, and request medication refills online. 1. In your internet browser, go to [email or web address] 2. Click on the First Time User? Click Here link in the Sign In box. You will see the New Member Sign Up page. 3. Enter your [employer] Access Code exactly as it appears below. You will not need to use this code after youve completed the sign-up process. If you do not sign up before the expiration date, you must request a new code. · [employer] Access Code: [email or web address] Expires: 3/19/2018 12:24 PM 
 
4. Enter the last four digits of your Social Security Number (xxxx) and Date of Birth (mm/dd/yyyy) as indicated and click Submit. You will be taken to the next sign-up page. 5. Create a [employer] ID. This will be your [employer] login ID and cannot be changed, so think of one that is secure and easy to remember. 6. Create a [employer] password. You can change your password at any time. 7. Enter your Password Reset Question and Answer. This can be used at a later time if you forget your password. 8. Enter your e-mail address. You will receive e-mail notification when new information is available in [address] 9. Click Sign Up. You can now view and download portions of your medical record. 10. Click the Download Summary menu link to download a portable copy of your medical information. If you have questions, please visit the Frequently Asked Questions section of the [employer] website. Remember, [employer] is NOT to be used for urgent needs. For medical emergencies, dial 911. Now available from your iPhone and Android! Unresulted Labs-Please follow up with your PCP about these lab tests Order Current Status XR HIP RT W OR WO PELV 2-3 VWS In process Providers Seen During Your Hospitalization Provider Specialty Primary office phone Marsha Bingham, [address] [facility] Orthopedic Surgery [phone] Your Primary Care Physician (PCP) Primary Care Physician Office Phone Office Fax Katherine Mccain [phone] You are allergic to the following No active allergies Recent Documentation Height Weight BMI OB Status Smoking Status 1.676 m 69.5 kg 24.75 kg/m2 Postmenopausal Never Smoker Emergency Contacts Name Discharge Info Relation Home Work Mobile Nikos Stoner  Spouse [0]   [phone] Patient Belongings The following personal items are in your possession at time of discharge: 
  Dental Appliances: None  Visual Aid: Glasses      Home Medications: None   Jewelry: None  Clothing: Pants, Shirt, Footwear, Undergarments, Jacket/Coat (spouse)    Other Valuables: Cell Phone, [place] Osgood (with spouse) Please provide this summary of care documentation to your next provider. Signatures-by signing, you are acknowledging that this After Visit Summary has been reviewed with you and you have received a copy. Patient Signature:  ____________________________________________________________ Date:  ____________________________________________________________  
  
Malcolm Aguilar Provider Signature:  ____________________________________________________________ Date:  ____________________________________________________________

## 2017-12-20 NOTE — DISCHARGE INSTRUCTIONS
Ash Velasco III, MD  [facility]    Lower Extremity Surgery  Discharge Instructions      Please take the time to review the following instructions before you leave the hospital and use them as guidelines during your recovery from surgery. If you have any questions you may contact my office at [phone]. Wound Care/Dressing Changes:    [x]   You may change your dressing as needed. Beginning the 2 days after you are discharged from the hospital you can change your dressing daily. A big, bulky dressing isn't necessary as long as there isn't any drainage from the incisions. You will be shown how to change the dressings properly by the home health aids as well. There will be a piece of tape over your incision that resembles gauze. This tape should stay on and you should not attempt to remove it. Once you begin to get this wet in the shower this will begin to fall off on its own, although it will take days. Do not apply antibiotic ointment to your incisions. Showering/Bathing:    [x]   You may shower 2 days after surgery. Your dressing may be removed for showering. You may get your incisions wet in the shower. Don't vigorously scrub the area where your incisions are. Please pat dry the incision. Apply a clean, dry dressing after drying off the area of your incisions. Don't take a tub bath, get in a swimming pool or Jacuzzi until the incisions are completely healed, and you are seen in the office by Dr. Mone Rodriguez. Do not soak your incisions under water. []   Do not get the dressing wet. Once you remove it two days from surgery, you may get the incision wet if there is no drainage. Weight Bearing Status/Braces/Activity:    []   If you have had a total knee replacement you may weight bear as tolerated with the knee immobilizer in place. Use crutches, cane, or walker as needed. You should sleep in your knee immobilizer.   You need to begin working on range of motion early after your surgery. It is very important to work on extension (straighthening) the knee. You will be advanced with walking and range of motion by physical therapy at home. [x]   If you have had a total hip replacement you may weight bear as tolerated. Physical therapy with come by your house to help you perform exercises and work on strength and range of motion. Ice/Elevation:    Continue ice and elevation consistently for 48 hours after surgery. If you have had a total knee replacement when elevating your knee elevate it on about 4 pillows to be sure it is above your heart. After 48 hours, you should ice your knee 3 times per day, for 20 minutes at a time for the next 5 days. After one week from surgery, you may use ice and elevation as needed for pain and swelling. Diet:    You may advance to your regular diet as tolerated. Medication:    1. You will be given a prescription for pain medications when you are discharged from the hospital.  Take the medication as needed according to the directions on the prescription bottle. Possible side effects of the medication include dizziness, headache, nausea, vomiting, constipation and urinary retention. If you experience any of these side effects call the office so that we can assist you in relieving them. Discontinue the use of the pain medication if you develop itching, rash, shortness of breath or difficulties swallowing. If these symptoms become severe or aren't relieved by discontinuing the medication you should seek immediate medical attention. Refills of pain medication are authorized during office hours only (8AM - 5PM Mon thru Fri). 2. If you were prescribed Percocet/oxycodone or Dilaudid/hydromorphone you must have a written prescription. These medications legally cannot be called in to a pharmacy. 3. Do not take Tylenol in addition to your pain medication as most of the pain medication already contains Tylenol.   Exceptions include Dilaudid/hydromorphone, Demerol/meperidine and roxicodone. Do not exceed 3000 mg of Tylenol per day. Ex:  (hydrocodone 5/325mg = 325 mg of Tylenol)  4. You should use Aspirin 81 mg twice daily for 21 days from the date of your surgery. This will help to prevent blood clots from forming in your legs. This needs to be started the day after your surgery and home healthcare will teach you how this is done. If you are taking another medication such as Xarelto as discussed with Dr. Dalila Lewis this should also be started the day after the surgery. 5. You may resume the medications you were taking prior to your surgery, unless otherwise specified in your discharge instructions. Pain medication may change the effects of any antidepressant medication. If you have any questions about possible interactions between your regular medications and the pain medication you should consult the physician who prescribes your regular medications. 6. If you had a total joint as an outpatient procedure and did not spend the night in the hospital, Dr. Dalila Lewis has written for an oral antibiotic that you should take as instructed. This antibiotic is generally Keflex or Clindamycin. If you spent the night in the hospital the antibiotic was given to you through the IV and there is nothing else to take orally. Follow Up Appointment:    If you are unsure of your follow-up appointment date and time, please call [phone]. Please let our  know you are scheduling a post-op appointment. Most appointments should be between 7-14 days after your surgery. Physical Therapy:    [x]   Physical therapy will be discussed with you at your first follow-up appointment with Dr. Dalila Lewis. You don't need to begin physical therapy prior to that visit. You are to participate with [address] [place] [address] as arranged pre-operatively in the convience of your own home.     Important signs and symptoms:    If any of the following signs and symptoms occurs, you should contact Dr. Carolyn Pena' office. Please be advised if a problem arises which you feel required immediate medical attention or your are unable to contact Dr. Carolyn Pena' office you should seek immediate medical attention. Signs and symptoms to watch for include:  1. A sudden increase in swelling and/or redness or warmth at the area your surgery was performed which isn't relieved by rest, ice and elevation. 2. Oral temperature greater than 101.5 degrees for 12 hours or more which isn't relieved by an increase in fluid intake and taking two Tylenol every 4-6 hours. Do not exceed 3000 mg of Tylenol per day. 3. Excessive drainage from your incisions or drainage that hasn't stopped by 72 hours. 4. Calf pian, tenderness, redness or swelling which isn't relieved with rest and elevation. 5. Fever, chills, shortness of breath, chest pain, nausea, vomiting or other signs and symptoms which are of concern to you. DISCHARGE SUMMARY from Nurse    PATIENT INSTRUCTIONS:    After general anesthesia or intravenous sedation, for 24 hours or while taking prescription Narcotics:  · Limit your activities  · Do not drive and operate hazardous machinery  · Do not make important personal or business decisions  · Do  not drink alcoholic beverages  · If you have not urinated within 8 hours after discharge, please contact your surgeon on call. Report the following to your surgeon:  · Excessive pain, swelling, redness or odor of or around the surgical area  · Temperature over 100.5  · Nausea and vomiting lasting longer than 4 hours or if unable to take medications  · Any signs of decreased circulation or nerve impairment to extremity: change in color, persistent  numbness, tingling, coldness or increase pain  · Any questions    What to do at Home:  Recommended activity: Ambulate in house    If you experience any of the following symptoms above, please follow up with Dr. Carolyn Pena.     *  Please give a list of your current medications to your Primary Care Provider. *  Please update this list whenever your medications are discontinued, doses are      changed, or new medications (including over-the-counter products) are added. *  Please carry medication information at all times in case of emergency situations. These are general instructions for a healthy lifestyle:    No smoking/ No tobacco products/ Avoid exposure to second hand smoke  Surgeon General's Warning:  Quitting smoking now greatly reduces serious risk to your health. Obesity, smoking, and sedentary lifestyle greatly increases your risk for illness    A healthy diet, regular physical exercise & weight monitoring are important for maintaining a healthy lifestyle    You may be retaining fluid if you have a history of heart failure or if you experience any of the following symptoms:  Weight gain of 3 pounds or more overnight or 5 pounds in a week, increased swelling in our hands or feet or shortness of breath while lying flat in bed. Please call your doctor as soon as you notice any of these symptoms; do not wait until your next office visit. Recognize signs and symptoms of STROKE:    F-face looks uneven    A-arms unable to move or move unevenly    S-speech slurred or non-existent    T-time-call 911 as soon as signs and symptoms begin-DO NOT go       Back to bed or wait to see if you get better-TIME IS BRAIN. Warning Signs of HEART ATTACK     Call 911 if you have these symptoms:   Chest discomfort. Most heart attacks involve discomfort in the center of the chest that lasts more than a few minutes, or that goes away and comes back. It can feel like uncomfortable pressure, squeezing, fullness, or pain.  Discomfort in other areas of the upper body. Symptoms can include pain or discomfort in one or both arms, the back, neck, jaw, or stomach.  Shortness of breath with or without chest discomfort.    Other signs may include breaking out in a cold sweat, nausea, or lightheadedness. Don't wait more than five minutes to call 911 - MINUTES MATTER! Fast action can save your life. Calling 911 is almost always the fastest way to get lifesaving treatment. Emergency Medical Services staff can begin treatment when they arrive -- up to an hour sooner than if someone gets to the hospital by car. Patient armband removed and shredded    The discharge information has been reviewed with the patient and caregiver. The patient and caregiver verbalized understanding. Discharge medications reviewed with the patient and caregiver and appropriate educational materials and side effects teaching were provided.   ___________________________________________________________________________________________________________________________________

## 2017-12-20 NOTE — PERIOP NOTES
Patient waiting on delivery of walker from home health. Spoke with Francis Worrell from [place] [address]. DME company was in route to deliver, could be an hour or more. Patient and  request delivery to home instead. Seema Kern agreed and changed delivery to patient home. Avs med list reviewed, no duplicates.   D/C instructions reviewed, opportunity for questions

## 2017-12-20 NOTE — PERIOP NOTES
TRANSFER - IN REPORT:    Verbal report received from [facility] crna (name) on Dorian Kahn  being received from or nit) for routine progression of care      Report consisted of patients Situation, Background, Assessment and   Recommendations(SBAR). Information from the following report(s) OR Summary, Procedure Summary, Intake/Output and MAR was reviewed with the receiving nurse. Opportunity for questions and clarification was provided. Assessment completed upon patients arrival to unit and care assumed.

## 2017-12-20 NOTE — ANESTHESIA POSTPROCEDURE EVALUATION
Post-Anesthesia Evaluation & Assessment    Visit Vitals    /82    Pulse 90    Temp 37.7 °C (99.8 °F)    Resp 10    Ht 5' 6\" (1.676 m)    Wt 69.5 kg (153 lb 5 oz)    SpO2 98%    BMI 24.75 kg/m2       Nausea/Vomiting: no nausea    Post-operative hydration adequate. Pain score (VAS): 2    Mental status & Level of consciousness: alert and oriented x 3    Neurological status: moves all extremities, sensation grossly intact    Pulmonary status: airway patent, no supplemental oxygen required    Complications related to anesthesia: none    Patient has met all discharge requirements.     Additional comments:        Jessica Slade MD

## (undated) DEVICE — BLADE SAW 1.27X13X90 MM FOR LG BNE

## (undated) DEVICE — STERILE POLYISOPRENE POWDER-FREE SURGICAL GLOVES WITH EMOLLIENT COATING: Brand: PROTEXIS

## (undated) DEVICE — 3M™ IOBAN™ 2 ANTIMICROBIAL INCISE DRAPE 6650EZ: Brand: IOBAN™ 2

## (undated) DEVICE — INSTRUMENT BATTERY

## (undated) DEVICE — REM POLYHESIVE ADULT PATIENT RETURN ELECTRODE: Brand: VALLEYLAB

## (undated) DEVICE — STERILE POLYISOPRENE POWDER-FREE SURGICAL GLOVES: Brand: PROTEXIS

## (undated) DEVICE — SYR LR LCK 1ML GRAD NSAF 30ML --

## (undated) DEVICE — SYSTEM SKIN CLSR 22CM DERMBND PRINEO

## (undated) DEVICE — KENDALL SCD EXPRESS SLEEVES, KNEE LENGTH, MEDIUM: Brand: KENDALL SCD

## (undated) DEVICE — PACK PROCEDURE SURG ANTR HIP

## (undated) DEVICE — DRESSING FOAM SELF ADH 20X10 CM ABSORBENT MEPILEX BORDER

## (undated) DEVICE — Z DISCONTINUED PER MEDLINE USE 2718072 DRESSING FOAM W5XL12.5CM SIL ADH THN BORDED CNFRM LO PROF

## (undated) DEVICE — GOWN,AURORA,NONRNF,XL,30/CS: Brand: MEDLINE

## (undated) DEVICE — SOL IRR STRL H2O 1500ML BTL --

## (undated) DEVICE — SOLUTION IV 250ML 0.9% SOD CHL CLR INJ FLX BG CONT PRT CLSR

## (undated) DEVICE — (D)PREP SKN CHLRAPRP APPL 26ML -- CONVERT TO ITEM 371833

## (undated) DEVICE — ORTHOLOCK(R) EX-PIN 4 MM X 150 MM

## (undated) DEVICE — SOL INJ L R 1000ML BG --

## (undated) DEVICE — COAXIAL FEMORAL CANAL TIP

## (undated) DEVICE — NEEDLE HYPO 22GA L1.5IN BLK S STL HUB POLYPR SHLD REG BVL

## (undated) DEVICE — SYRINGE MED 20ML STD CLR PLAS LUERLOCK TIP N CTRL DISP

## (undated) DEVICE — SUT VCRL + 1 36IN CT1 VIO --

## (undated) DEVICE — SUT VCRL + 2-0 36IN CT1 UD --

## (undated) DEVICE — INTENDED FOR TISSUE SEPARATION, AND OTHER PROCEDURES THAT REQUIRE A SHARP SURGICAL BLADE TO PUNCTURE OR CUT.: Brand: BARD-PARKER ® CARBON RIB-BACK BLADES

## (undated) DEVICE — NDL PRT INJ NSAF BLNT 18GX1.5 --

## (undated) DEVICE — DERMABOND SKIN ADH 0.7ML -- DERMABOND ADVANCED 12/BX

## (undated) DEVICE — Device

## (undated) DEVICE — SOL IRRIGATION INJ NACL 0.9% 500ML BTL

## (undated) DEVICE — PLUS HANDPIECE WITH SPRAY TIP: Brand: SURGILAV

## (undated) DEVICE — (D)SYR 10ML 1/5ML GRAD NSAF -- PKGING CHANGE USE ITEM 338027